# Patient Record
Sex: MALE | Race: WHITE | NOT HISPANIC OR LATINO | Employment: UNEMPLOYED | URBAN - METROPOLITAN AREA
[De-identification: names, ages, dates, MRNs, and addresses within clinical notes are randomized per-mention and may not be internally consistent; named-entity substitution may affect disease eponyms.]

---

## 2022-05-04 ENCOUNTER — APPOINTMENT (OUTPATIENT)
Dept: CT IMAGING | Facility: CLINIC | Age: 42
End: 2022-05-04
Attending: EMERGENCY MEDICINE
Payer: COMMERCIAL

## 2022-05-04 ENCOUNTER — HOSPITAL ENCOUNTER (OUTPATIENT)
Facility: CLINIC | Age: 42
Setting detail: OBSERVATION
Discharge: HOME | End: 2022-05-05
Attending: EMERGENCY MEDICINE | Admitting: INTERNAL MEDICINE
Payer: COMMERCIAL

## 2022-05-04 DIAGNOSIS — R00.2 PALPITATIONS: ICD-10-CM

## 2022-05-04 DIAGNOSIS — F10.930 ALCOHOL WITHDRAWAL SYNDROME WITHOUT COMPLICATION (H): Primary | ICD-10-CM

## 2022-05-04 DIAGNOSIS — R19.00 INTRAABDOMINAL MASS: ICD-10-CM

## 2022-05-04 DIAGNOSIS — R00.0 TACHYCARDIA: ICD-10-CM

## 2022-05-04 DIAGNOSIS — R06.02 SHORTNESS OF BREATH: ICD-10-CM

## 2022-05-04 PROBLEM — F10.939 ALCOHOL WITHDRAWAL (H): Status: ACTIVE | Noted: 2022-05-04

## 2022-05-04 LAB
ALBUMIN SERPL-MCNC: 4.1 G/DL (ref 3.4–5)
ALP SERPL-CCNC: 64 U/L (ref 40–150)
ALT SERPL W P-5'-P-CCNC: 52 U/L (ref 0–70)
ANION GAP SERPL CALCULATED.3IONS-SCNC: 4 MMOL/L (ref 3–14)
AST SERPL W P-5'-P-CCNC: 17 U/L (ref 0–45)
BASOPHILS # BLD AUTO: 0 10E3/UL (ref 0–0.2)
BASOPHILS NFR BLD AUTO: 0 %
BILIRUB SERPL-MCNC: 1.1 MG/DL (ref 0.2–1.3)
BUN SERPL-MCNC: 17 MG/DL (ref 7–30)
CALCIUM SERPL-MCNC: 9.1 MG/DL (ref 8.5–10.1)
CHLORIDE BLD-SCNC: 105 MMOL/L (ref 94–109)
CO2 SERPL-SCNC: 28 MMOL/L (ref 20–32)
CREAT SERPL-MCNC: 0.93 MG/DL (ref 0.66–1.25)
EOSINOPHIL # BLD AUTO: 0.1 10E3/UL (ref 0–0.7)
EOSINOPHIL NFR BLD AUTO: 2 %
ERYTHROCYTE [DISTWIDTH] IN BLOOD BY AUTOMATED COUNT: 13.2 % (ref 10–15)
GFR SERPL CREATININE-BSD FRML MDRD: >90 ML/MIN/1.73M2
GLUCOSE BLD-MCNC: 130 MG/DL (ref 70–99)
HCT VFR BLD AUTO: 44.6 % (ref 40–53)
HGB BLD-MCNC: 14.7 G/DL (ref 13.3–17.7)
IMM GRANULOCYTES # BLD: 0 10E3/UL
IMM GRANULOCYTES NFR BLD: 0 %
LIPASE SERPL-CCNC: 79 U/L (ref 73–393)
LYMPHOCYTES # BLD AUTO: 1.4 10E3/UL (ref 0.8–5.3)
LYMPHOCYTES NFR BLD AUTO: 18 %
MCH RBC QN AUTO: 29.3 PG (ref 26.5–33)
MCHC RBC AUTO-ENTMCNC: 33 G/DL (ref 31.5–36.5)
MCV RBC AUTO: 89 FL (ref 78–100)
MONOCYTES # BLD AUTO: 0.8 10E3/UL (ref 0–1.3)
MONOCYTES NFR BLD AUTO: 10 %
NEUTROPHILS # BLD AUTO: 5.5 10E3/UL (ref 1.6–8.3)
NEUTROPHILS NFR BLD AUTO: 70 %
NRBC # BLD AUTO: 0 10E3/UL
NRBC BLD AUTO-RTO: 0 /100
PLATELET # BLD AUTO: 303 10E3/UL (ref 150–450)
POTASSIUM BLD-SCNC: 3.5 MMOL/L (ref 3.4–5.3)
PROT SERPL-MCNC: 7.6 G/DL (ref 6.8–8.8)
RBC # BLD AUTO: 5.01 10E6/UL (ref 4.4–5.9)
SARS-COV-2 RNA RESP QL NAA+PROBE: NEGATIVE
SODIUM SERPL-SCNC: 137 MMOL/L (ref 133–144)
TROPONIN I SERPL HS-MCNC: 4 NG/L
TROPONIN I SERPL HS-MCNC: <3 NG/L
TSH SERPL DL<=0.005 MIU/L-ACNC: 0.83 MU/L (ref 0.4–4)
WBC # BLD AUTO: 7.9 10E3/UL (ref 4–11)

## 2022-05-04 PROCEDURE — G0378 HOSPITAL OBSERVATION PER HR: HCPCS

## 2022-05-04 PROCEDURE — 250N000013 HC RX MED GY IP 250 OP 250 PS 637: Performed by: EMERGENCY MEDICINE

## 2022-05-04 PROCEDURE — 84443 ASSAY THYROID STIM HORMONE: CPT | Performed by: EMERGENCY MEDICINE

## 2022-05-04 PROCEDURE — 74177 CT ABD & PELVIS W/CONTRAST: CPT

## 2022-05-04 PROCEDURE — 96361 HYDRATE IV INFUSION ADD-ON: CPT

## 2022-05-04 PROCEDURE — C9803 HOPD COVID-19 SPEC COLLECT: HCPCS

## 2022-05-04 PROCEDURE — 93005 ELECTROCARDIOGRAM TRACING: CPT

## 2022-05-04 PROCEDURE — 83690 ASSAY OF LIPASE: CPT | Performed by: EMERGENCY MEDICINE

## 2022-05-04 PROCEDURE — 250N000011 HC RX IP 250 OP 636: Performed by: EMERGENCY MEDICINE

## 2022-05-04 PROCEDURE — 96374 THER/PROPH/DIAG INJ IV PUSH: CPT

## 2022-05-04 PROCEDURE — 250N000009 HC RX 250: Performed by: EMERGENCY MEDICINE

## 2022-05-04 PROCEDURE — 99285 EMERGENCY DEPT VISIT HI MDM: CPT | Mod: 25

## 2022-05-04 PROCEDURE — 36415 COLL VENOUS BLD VENIPUNCTURE: CPT | Performed by: EMERGENCY MEDICINE

## 2022-05-04 PROCEDURE — 85025 COMPLETE CBC W/AUTO DIFF WBC: CPT | Performed by: EMERGENCY MEDICINE

## 2022-05-04 PROCEDURE — 80053 COMPREHEN METABOLIC PANEL: CPT | Performed by: EMERGENCY MEDICINE

## 2022-05-04 PROCEDURE — 258N000003 HC RX IP 258 OP 636: Performed by: EMERGENCY MEDICINE

## 2022-05-04 PROCEDURE — 84484 ASSAY OF TROPONIN QUANT: CPT | Performed by: EMERGENCY MEDICINE

## 2022-05-04 PROCEDURE — U0003 INFECTIOUS AGENT DETECTION BY NUCLEIC ACID (DNA OR RNA); SEVERE ACUTE RESPIRATORY SYNDROME CORONAVIRUS 2 (SARS-COV-2) (CORONAVIRUS DISEASE [COVID-19]), AMPLIFIED PROBE TECHNIQUE, MAKING USE OF HIGH THROUGHPUT TECHNOLOGIES AS DESCRIBED BY CMS-2020-01-R: HCPCS | Performed by: EMERGENCY MEDICINE

## 2022-05-04 RX ORDER — GABAPENTIN 300 MG/1
900 CAPSULE ORAL EVERY 8 HOURS
Status: DISCONTINUED | OUTPATIENT
Start: 2022-05-05 | End: 2022-05-05 | Stop reason: HOSPADM

## 2022-05-04 RX ORDER — LORAZEPAM 2 MG/ML
1 INJECTION INTRAMUSCULAR ONCE
Status: COMPLETED | OUTPATIENT
Start: 2022-05-04 | End: 2022-05-04

## 2022-05-04 RX ORDER — FLUMAZENIL 0.1 MG/ML
0.2 INJECTION, SOLUTION INTRAVENOUS
Status: DISCONTINUED | OUTPATIENT
Start: 2022-05-04 | End: 2022-05-05 | Stop reason: HOSPADM

## 2022-05-04 RX ORDER — GABAPENTIN 300 MG/1
1200 CAPSULE ORAL ONCE
Status: COMPLETED | OUTPATIENT
Start: 2022-05-04 | End: 2022-05-04

## 2022-05-04 RX ORDER — FOLIC ACID 1 MG/1
1 TABLET ORAL DAILY
Status: DISCONTINUED | OUTPATIENT
Start: 2022-05-04 | End: 2022-05-05 | Stop reason: HOSPADM

## 2022-05-04 RX ORDER — MULTIPLE VITAMINS W/ MINERALS TAB 9MG-400MCG
1 TAB ORAL DAILY
Status: DISCONTINUED | OUTPATIENT
Start: 2022-05-04 | End: 2022-05-05 | Stop reason: HOSPADM

## 2022-05-04 RX ORDER — GABAPENTIN 100 MG/1
100 CAPSULE ORAL EVERY 8 HOURS
Status: DISCONTINUED | OUTPATIENT
Start: 2022-05-12 | End: 2022-05-05 | Stop reason: HOSPADM

## 2022-05-04 RX ORDER — DIAZEPAM 5 MG
10 TABLET ORAL EVERY 30 MIN PRN
Status: DISCONTINUED | OUTPATIENT
Start: 2022-05-04 | End: 2022-05-05 | Stop reason: HOSPADM

## 2022-05-04 RX ORDER — HALOPERIDOL 5 MG/ML
2.5-5 INJECTION INTRAMUSCULAR EVERY 6 HOURS PRN
Status: DISCONTINUED | OUTPATIENT
Start: 2022-05-04 | End: 2022-05-05 | Stop reason: HOSPADM

## 2022-05-04 RX ORDER — OLANZAPINE 5 MG/1
5-10 TABLET, ORALLY DISINTEGRATING ORAL EVERY 6 HOURS PRN
Status: DISCONTINUED | OUTPATIENT
Start: 2022-05-04 | End: 2022-05-05 | Stop reason: HOSPADM

## 2022-05-04 RX ORDER — DIAZEPAM 10 MG/2ML
5-10 INJECTION, SOLUTION INTRAMUSCULAR; INTRAVENOUS EVERY 30 MIN PRN
Status: DISCONTINUED | OUTPATIENT
Start: 2022-05-04 | End: 2022-05-05 | Stop reason: HOSPADM

## 2022-05-04 RX ORDER — GABAPENTIN 300 MG/1
300 CAPSULE ORAL EVERY 8 HOURS
Status: DISCONTINUED | OUTPATIENT
Start: 2022-05-10 | End: 2022-05-05 | Stop reason: HOSPADM

## 2022-05-04 RX ORDER — IOPAMIDOL 755 MG/ML
111 INJECTION, SOLUTION INTRAVASCULAR ONCE
Status: COMPLETED | OUTPATIENT
Start: 2022-05-04 | End: 2022-05-04

## 2022-05-04 RX ORDER — GABAPENTIN 300 MG/1
600 CAPSULE ORAL EVERY 8 HOURS
Status: DISCONTINUED | OUTPATIENT
Start: 2022-05-08 | End: 2022-05-05 | Stop reason: HOSPADM

## 2022-05-04 RX ORDER — CLONIDINE HYDROCHLORIDE 0.1 MG/1
0.1 TABLET ORAL EVERY 8 HOURS
Status: DISCONTINUED | OUTPATIENT
Start: 2022-05-04 | End: 2022-05-05 | Stop reason: HOSPADM

## 2022-05-04 RX ORDER — IBUPROFEN 200 MG
400-800 TABLET ORAL EVERY 8 HOURS PRN
COMMUNITY

## 2022-05-04 RX ADMIN — MULTIPLE VITAMINS W/ MINERALS TAB 1 TABLET: TAB at 22:26

## 2022-05-04 RX ADMIN — SODIUM CHLORIDE 1000 ML: 9 INJECTION, SOLUTION INTRAVENOUS at 15:32

## 2022-05-04 RX ADMIN — SODIUM CHLORIDE 100 ML: 9 INJECTION, SOLUTION INTRAVENOUS at 16:58

## 2022-05-04 RX ADMIN — THIAMINE HCL TAB 100 MG 100 MG: 100 TAB at 22:26

## 2022-05-04 RX ADMIN — DIAZEPAM 10 MG: 5 TABLET ORAL at 22:38

## 2022-05-04 RX ADMIN — CLONIDINE HYDROCHLORIDE 0.1 MG: 0.1 TABLET ORAL at 22:25

## 2022-05-04 RX ADMIN — LORAZEPAM 1 MG: 2 INJECTION INTRAMUSCULAR; INTRAVENOUS at 19:50

## 2022-05-04 RX ADMIN — FOLIC ACID 1 MG: 1 TABLET ORAL at 22:26

## 2022-05-04 RX ADMIN — IOPAMIDOL 111 ML: 755 INJECTION, SOLUTION INTRAVENOUS at 16:57

## 2022-05-04 RX ADMIN — GABAPENTIN 1200 MG: 300 CAPSULE ORAL at 22:26

## 2022-05-04 ASSESSMENT — ENCOUNTER SYMPTOMS
LIGHT-HEADEDNESS: 1
FATIGUE: 1
DIAPHORESIS: 1
PALPITATIONS: 1

## 2022-05-04 NOTE — ED NOTES
Bed: ED29  Expected date: 5/4/22  Expected time: 3:02 PM  Means of arrival: Ambulance  Comments:  89- 74M - HTN, Tachy

## 2022-05-04 NOTE — ED TRIAGE NOTES
Pt presents after feeling dizzy and confused at the [facility].  Pt watch informed his of a rapid HR at this time.  EMS found pt to have a HR in the 150s, this had since resolved.  Pt currently denies SOB or CP.   Triage Assessment     Row Name 05/04/22 1514       Triage Assessment (Adult)    Airway WDL WDL       Respiratory WDL    Respiratory WDL WDL       Skin Circulation/Temperature WDL    Skin Circulation/Temperature WDL WDL       Cardiac WDL    Cardiac WDL X  Pt tachycardic with a HR in the 150s for EMS., HE in 120sa now       Peripheral/Neurovascular WDL    Peripheral Neurovascular WDL WDL       Cognitive/Neuro/Behavioral WDL    Cognitive/Neuro/Behavioral WDL WDL

## 2022-05-04 NOTE — ED PROVIDER NOTES
Here with near syncope, EKG with sinus tachycardia.  CT PE/abdomen pelvis: Awaiting CT and delta trop. Then discharge.     CT Chest (PE) Abdomen Pelvis w Contrast    (Results Pending)     Labs Ordered and Resulted from Time of ED Arrival to Time of ED Departure   COMPREHENSIVE METABOLIC PANEL - Abnormal       Result Value    Sodium 137      Potassium 3.5      Chloride 105      Carbon Dioxide (CO2) 28      Anion Gap 4      Urea Nitrogen 17      Creatinine 0.93      Calcium 9.1      Glucose 130 (*)     Alkaline Phosphatase 64      AST 17      ALT 52      Protein Total 7.6      Albumin 4.1      Bilirubin Total 1.1      GFR Estimate >90     TROPONIN I - Normal    Troponin I High Sensitivity <3     LIPASE - Normal    Lipase 79     TSH WITH FREE T4 REFLEX - Normal    TSH 0.83     TROPONIN I - Normal    Troponin I High Sensitivity 4     CBC WITH PLATELETS AND DIFFERENTIAL    WBC Count 7.9      RBC Count 5.01      Hemoglobin 14.7      Hematocrit 44.6      MCV 89      MCH 29.3      MCHC 33.0      RDW 13.2      Platelet Count 303      % Neutrophils 70      % Lymphocytes 18      % Monocytes 10      % Eosinophils 2      % Basophils 0      % Immature Granulocytes 0      NRBCs per 100 WBC 0      Absolute Neutrophils 5.5      Absolute Lymphocytes 1.4      Absolute Monocytes 0.8      Absolute Eosinophils 0.1      Absolute Basophils 0.0      Absolute Immature Granulocytes 0.0      Absolute NRBCs 0.0     COVID-19 VIRUS (CORONAVIRUS) BY PCR     Patient here for evaluation of elevated heart rate, confirmed to be sinus tachycardia on EKG.  He had a comprehensive negative work-up as above however on recheck had worsening tachycardia despite initial improvement IV fluids, his heart rate jumped up in the 140s and 150s with ambulation, he did feel little bit lightheaded, and initial check he did not have any overt tongue fasciculation however with Ativan his heart rate did improve some, on recheck heart rate continued to be elevated and he  manifested tongue fasciculations and then further discussion he reports heavier drinking over the last 1 month, no alcohol for the last 2 days.  I suspect likely moderate alcohol withdrawal as a cause of his persistent tachycardia, and hypertension.  He was placed on CIWA protocol, started on gabapentin loading and will admit to observation under Dr. 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    Attila Delaney MD  [employer]  5:37 PM 05/04/22        Attila Delaney MD  05/04/22 3718

## 2022-05-04 NOTE — ED PROVIDER NOTES
History   Chief Complaint:  Tachycardia       The history is provided by the patient.      Elliott Newton is a 42 year old male with an unknown medical history who presents with tachycardia. The patient recently [place] the [place] five [place] ago for a conference. Since then, he has been consistently fatigued due to a lack of sleep as well as trying to learn so much information. He has been spending his days at sessions for the conference and at night, many of them go out drinking for social events. Today while at one of the sessions, he became diaphoretic and felt that his heart was racing. He then stood up to try and go back to his hotel but was light headed and could not remember how to get back. He denies any loss of consciousness or chest pain. He denies any history of heart disease. He denies any other symptoms. He takes an injectable drug to help with an unknown autoimmune disease in [place]. It is like rheumatoid arthritis but not exactly and he cannot remember the name in English.     He tells me that he did not have any alcohol yesterday, and also that he drinks frequently back in [place], but not daily.  He has never gone through alcohol withdrawal before.     Review of Systems   Constitutional: Positive for diaphoresis and fatigue.   Cardiovascular: Positive for palpitations. Negative for chest pain.   Neurological: Positive for light-headedness.   All other systems reviewed and are negative.        Allergies:  The patient has no known allergies.     Medications:  The patient is currently on no regular medications.  Injection for autoimmune disease    Past Medical History:     The patient has no pertinent medical history.  Autoimmune disease treated in [place]      Past Surgical History:    The patient has no pertinent surgical history.     Family History:    The patient has no known family history.    Social History:  The patient presents to the ED alone. He is from [place].       Physical Exam  "    Patient Vitals for the past 24 hrs:   BP Temp Temp src Pulse Resp SpO2 Height Weight   05/04/22 1512 (!) 150/105 98.7  F (37.1  C) Temporal 120 18 98 % 1.778 m (5' 10\") 100 kg (220 lb 7.4 oz)       Physical Exam  Vitals: reviewed by me  General: Pt seen on [facility], cooperative, and alert to conversation.  Mildly diaphoretic.  Eyes: Tracking well, clear conjunctiva BL  ENT: MMM, midline trachea.   Lungs: No tachypnea, no accessory muscle use. No respiratory distress.  Good air movement bilaterally.  CV: Rate as above, tachycardic.  Normal S1-S2, no additional heart sounds heard  Abd: Soft, vague periumbilical tenderness to palpation, no guarding, no rebound.  MSK: no joint effusion.  No evidence of trauma  Skin: No rash  Neuro: Clear speech and no facial droop.  Moving all extremity spontaneously, no tremors.  Moving all extremity spontaneously, following all commands  Psych: Not RIS, no e/o AH/VH    Emergency Department Course   ECG  ECG obtained at 1519, ECG read at 1520  Sinus tachycardia. Moderate voltage criteria for LVH, may be normal variant. Borderline ECG.    Rate 132 bpm. MN interval 130 ms. QRS duration 84 ms. QT/QTc 314/465 ms. P-R-T axes 62 -15 44.     Imaging:  CT Chest (PE) Abdomen Pelvis w Contrast   Final Result   IMPRESSION:   1.  No evidence of pulmonary embolus or other acute abnormality in the chest.   2.  No acute abnormality in the abdomen or pelvis.   3.  Cholelithiasis.   4.  Incidental left upper quadrant multiloculated cystic-appearing lesion. Differential includes lymphangioma, less likely enteric duplication cyst. This is unlikely to contribute to patient's acute symptoms. Comparison with prior imaging would be    helpful to document stability. Otherwise, could consider MRI on a nonemergent basis.        Report per radiology    Laboratory:  Labs Ordered and Resulted from Time of ED Arrival to Time of ED Departure   COMPREHENSIVE METABOLIC PANEL - Abnormal       " Result Value    Sodium 137      Potassium 3.5      Chloride 105      Carbon Dioxide (CO2) 28      Anion Gap 4      Urea Nitrogen 17      Creatinine 0.93      Calcium 9.1      Glucose 130 (*)     Alkaline Phosphatase 64      AST 17      ALT 52      Protein Total 7.6      Albumin 4.1      Bilirubin Total 1.1      GFR Estimate >90     TROPONIN I - Normal    Troponin I High Sensitivity <3     LIPASE - Normal    Lipase 79     TSH WITH FREE T4 REFLEX - Normal    TSH 0.83     TROPONIN I - Normal    Troponin I High Sensitivity 4     COVID-19 VIRUS (CORONAVIRUS) BY PCR - Normal    SARS CoV2 PCR Negative     CBC WITH PLATELETS AND DIFFERENTIAL    WBC Count 7.9      RBC Count 5.01      Hemoglobin 14.7      Hematocrit 44.6      MCV 89      MCH 29.3      MCHC 33.0      RDW 13.2      Platelet Count 303      % Neutrophils 70      % Lymphocytes 18      % Monocytes 10      % Eosinophils 2      % Basophils 0      % Immature Granulocytes 0      NRBCs per 100 WBC 0      Absolute Neutrophils 5.5      Absolute Lymphocytes 1.4      Absolute Monocytes 0.8      Absolute Eosinophils 0.1      Absolute Basophils 0.0      Absolute Immature Granulocytes 0.0      Absolute NRBCs 0.0          Procedures    Emergency Department Course:    Reviewed:  I reviewed nursing notes and vitals    Assessments:  1520 I obtained history and examined the patient as noted above.   1743 I rechecked the patient and explained findings.  Currently pending CT scan read, patient asking if he will be out of here soon enough to [place] it [place] tonight.      Interventions:  Medications   iopamidol (ISOVUE-370) solution 111 mL (has no administration in time range)   Saline Flush (has no administration in time range)   0.9% sodium chloride BOLUS (1,000 mLs Intravenous New Bag 5/4/22 1532)       Disposition:  Care of the patient was transferred to my colleague Dr. Delaney pending CT scan, delta troponin, and resolution of tachycardia.     Impression & Plan      Medical Decision Making:  This is a very pleasant 42-year-old male presents emergency room with what appears to be sinus tachycardia, diaphoresis, and dehydration.  He did have some red bull earlier today, which may have caused some of these palpitations, and specifically I will note that I do not see any evidence of A. fib or SVT or cardiac arrhythmia on his EKG. At time of signout, he is pending a CT scan to rule out PE as a cause of his tachycardia, delta troponin, and resolution of his tachycardia.  At rest he is about 110, but when stimulated or when he gets up to walk, he goes back up into the 130s.  His TSH is normal and I see no evidence of thyroid storm.  I asked him about his alcohol history, and he states he does not drink daily and will go several days at a time without drinking back home without issue. Here, his last drink was 2 days ago, which does raise the possibility of alcohol withdrawal, though he has no tremors on my exam at this point.  He initially told me he wanted to leave and go to the [place], but he is still persistently tachycardic and I do not think he should leave unless his vital signs normalized of course.  Will sign out to Dr. Delaney, pending his CT scan for rule out of PE, delta troponin, and resolution of tachycardia.      Diagnosis:    ICD-10-CM    1. Alcohol withdrawal syndrome without complication (H)  F10.230    2. Tachycardia  R00.0    3. Palpitations  R00.2    4. Shortness of breath  R06.02    5. Intraabdominal mass  R19.00     likely cyst, needs repeat imaging (MRI) following up with your primary doctor       Discharge Medications:  New Prescriptions    No medications on file       Scribe Disclosure:  I, Tabatha Mendez, am serving as a scribe at 3:13 PM on 5/4/2022 to document services personally performed by Florentin Terrazas MD based on my observations and the provider's statements to me.              Florentin Terrazas MD  05/05/22  0718

## 2022-05-05 VITALS
HEART RATE: 101 BPM | SYSTOLIC BLOOD PRESSURE: 135 MMHG | WEIGHT: 220.46 LBS | DIASTOLIC BLOOD PRESSURE: 95 MMHG | HEIGHT: 70 IN | TEMPERATURE: 98.3 F | OXYGEN SATURATION: 96 % | BODY MASS INDEX: 31.56 KG/M2 | RESPIRATION RATE: 16 BRPM

## 2022-05-05 PROCEDURE — 250N000013 HC RX MED GY IP 250 OP 250 PS 637: Performed by: STUDENT IN AN ORGANIZED HEALTH CARE EDUCATION/TRAINING PROGRAM

## 2022-05-05 PROCEDURE — 250N000013 HC RX MED GY IP 250 OP 250 PS 637: Performed by: INTERNAL MEDICINE

## 2022-05-05 PROCEDURE — 250N000013 HC RX MED GY IP 250 OP 250 PS 637: Performed by: EMERGENCY MEDICINE

## 2022-05-05 PROCEDURE — 99217 PR OBSERVATION CARE DISCHARGE: CPT | Performed by: STUDENT IN AN ORGANIZED HEALTH CARE EDUCATION/TRAINING PROGRAM

## 2022-05-05 PROCEDURE — 258N000003 HC RX IP 258 OP 636: Performed by: INTERNAL MEDICINE

## 2022-05-05 PROCEDURE — G0378 HOSPITAL OBSERVATION PER HR: HCPCS

## 2022-05-05 PROCEDURE — 258N000003 HC RX IP 258 OP 636: Performed by: EMERGENCY MEDICINE

## 2022-05-05 PROCEDURE — 96361 HYDRATE IV INFUSION ADD-ON: CPT

## 2022-05-05 RX ORDER — LIDOCAINE 40 MG/G
CREAM TOPICAL
Status: DISCONTINUED | OUTPATIENT
Start: 2022-05-05 | End: 2022-05-05 | Stop reason: HOSPADM

## 2022-05-05 RX ORDER — ONDANSETRON 4 MG/1
4 TABLET, FILM COATED ORAL EVERY 8 HOURS PRN
Qty: 12 TABLET | Refills: 0 | Status: SHIPPED | OUTPATIENT
Start: 2022-05-05

## 2022-05-05 RX ORDER — ACETAMINOPHEN 650 MG/1
650 SUPPOSITORY RECTAL EVERY 6 HOURS PRN
Status: DISCONTINUED | OUTPATIENT
Start: 2022-05-05 | End: 2022-05-05 | Stop reason: HOSPADM

## 2022-05-05 RX ORDER — ONDANSETRON 2 MG/ML
4 INJECTION INTRAMUSCULAR; INTRAVENOUS EVERY 6 HOURS PRN
Status: DISCONTINUED | OUTPATIENT
Start: 2022-05-05 | End: 2022-05-05 | Stop reason: HOSPADM

## 2022-05-05 RX ORDER — PROCHLORPERAZINE 25 MG
25 SUPPOSITORY, RECTAL RECTAL EVERY 12 HOURS PRN
Status: DISCONTINUED | OUTPATIENT
Start: 2022-05-05 | End: 2022-05-05 | Stop reason: HOSPADM

## 2022-05-05 RX ORDER — AMLODIPINE BESYLATE 10 MG/1
10 TABLET ORAL DAILY
Status: DISCONTINUED | OUTPATIENT
Start: 2022-05-05 | End: 2022-05-05 | Stop reason: HOSPADM

## 2022-05-05 RX ORDER — AMOXICILLIN 250 MG
1 CAPSULE ORAL 2 TIMES DAILY PRN
Status: DISCONTINUED | OUTPATIENT
Start: 2022-05-05 | End: 2022-05-05 | Stop reason: HOSPADM

## 2022-05-05 RX ORDER — BISACODYL 10 MG
10 SUPPOSITORY, RECTAL RECTAL DAILY PRN
Status: DISCONTINUED | OUTPATIENT
Start: 2022-05-05 | End: 2022-05-05 | Stop reason: HOSPADM

## 2022-05-05 RX ORDER — PROCHLORPERAZINE MALEATE 10 MG
10 TABLET ORAL EVERY 6 HOURS PRN
Status: DISCONTINUED | OUTPATIENT
Start: 2022-05-05 | End: 2022-05-05 | Stop reason: HOSPADM

## 2022-05-05 RX ORDER — AMOXICILLIN 250 MG
2 CAPSULE ORAL 2 TIMES DAILY PRN
Status: DISCONTINUED | OUTPATIENT
Start: 2022-05-05 | End: 2022-05-05 | Stop reason: HOSPADM

## 2022-05-05 RX ORDER — ACETAMINOPHEN 325 MG/1
650 TABLET ORAL EVERY 6 HOURS PRN
Status: DISCONTINUED | OUTPATIENT
Start: 2022-05-05 | End: 2022-05-05 | Stop reason: HOSPADM

## 2022-05-05 RX ORDER — AMLODIPINE BESYLATE 10 MG/1
10 TABLET ORAL DAILY
Qty: 30 TABLET | Refills: 0 | Status: SHIPPED | OUTPATIENT
Start: 2022-05-05

## 2022-05-05 RX ORDER — IBUPROFEN 600 MG/1
600 TABLET, FILM COATED ORAL EVERY 6 HOURS PRN
Status: DISCONTINUED | OUTPATIENT
Start: 2022-05-05 | End: 2022-05-05 | Stop reason: HOSPADM

## 2022-05-05 RX ORDER — FOLIC ACID 1 MG/1
1 TABLET ORAL DAILY
Qty: 10 TABLET | Refills: 0 | Status: SHIPPED | OUTPATIENT
Start: 2022-05-06

## 2022-05-05 RX ORDER — ONDANSETRON 4 MG/1
4 TABLET, ORALLY DISINTEGRATING ORAL EVERY 6 HOURS PRN
Status: DISCONTINUED | OUTPATIENT
Start: 2022-05-05 | End: 2022-05-05 | Stop reason: HOSPADM

## 2022-05-05 RX ORDER — NITROGLYCERIN 0.4 MG/1
0.4 TABLET SUBLINGUAL EVERY 5 MIN PRN
Status: DISCONTINUED | OUTPATIENT
Start: 2022-05-05 | End: 2022-05-05 | Stop reason: HOSPADM

## 2022-05-05 RX ORDER — LANOLIN ALCOHOL/MO/W.PET/CERES
100 CREAM (GRAM) TOPICAL DAILY
Qty: 10 TABLET | Refills: 0 | Status: SHIPPED | OUTPATIENT
Start: 2022-05-06

## 2022-05-05 RX ORDER — SODIUM CHLORIDE 9 MG/ML
INJECTION, SOLUTION INTRAVENOUS CONTINUOUS
Status: DISCONTINUED | OUTPATIENT
Start: 2022-05-05 | End: 2022-05-05 | Stop reason: HOSPADM

## 2022-05-05 RX ADMIN — SODIUM CHLORIDE: 9 INJECTION, SOLUTION INTRAVENOUS at 11:44

## 2022-05-05 RX ADMIN — GABAPENTIN 900 MG: 300 CAPSULE ORAL at 05:35

## 2022-05-05 RX ADMIN — CLONIDINE HYDROCHLORIDE 0.1 MG: 0.1 TABLET ORAL at 05:35

## 2022-05-05 RX ADMIN — GABAPENTIN 900 MG: 300 CAPSULE ORAL at 15:15

## 2022-05-05 RX ADMIN — MELATONIN 5 MG TABLET 5 MG: at 00:28

## 2022-05-05 RX ADMIN — CLONIDINE HYDROCHLORIDE 0.1 MG: 0.1 TABLET ORAL at 13:20

## 2022-05-05 RX ADMIN — MULTIPLE VITAMINS W/ MINERALS TAB 1 TABLET: TAB at 09:08

## 2022-05-05 RX ADMIN — THIAMINE HCL TAB 100 MG 100 MG: 100 TAB at 09:08

## 2022-05-05 RX ADMIN — DIAZEPAM 10 MG: 5 TABLET ORAL at 00:31

## 2022-05-05 RX ADMIN — SODIUM CHLORIDE 1000 ML: 9 INJECTION, SOLUTION INTRAVENOUS at 00:24

## 2022-05-05 RX ADMIN — FOLIC ACID 1 MG: 1 TABLET ORAL at 09:08

## 2022-05-05 RX ADMIN — AMLODIPINE BESYLATE 10 MG: 10 TABLET ORAL at 13:20

## 2022-05-05 RX ADMIN — SODIUM CHLORIDE: 9 INJECTION, SOLUTION INTRAVENOUS at 02:19

## 2022-05-05 NOTE — PROGRESS NOTES
A&Ox4. VSS on RA ex slightly tachy (low 100s this afternoon) and htn improving, last /95. Tele: sinus tach. Denies nausea, SOB, chest pain/discomfort. No hallucinations or tactile disturbances. CIWA 3 for paroxysmal sweats. Ambulating at baseline, steady on feet. Skye reg diet, good appetite. Reviewed AVS and new medication education. Encouraged cessation of alcohol consumption. 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 Denies further questions/concerns. Pt discharging back to his hotel via uber.

## 2022-05-05 NOTE — H&P
Admitted: 05/04/2022    PRIMARY CARE PHYSICIAN:  In [place].    CODE STATUS:  Full code.    CHIEF COMPLAINT:  Tachycardia and diaphoresis.    HISTORY OF PRESENT ILLNESS:  Mr. Newton is a 42-year-old male with a past medical history significant for ankylosing spondylitis and SUSAN, who presents to the Emergency Department with the above concerns.  History is obtained through discussion with the ED physician as well as the patient.  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  He has been here for approximately 5 days and notes that he still feels a bit jet lagged and so has been using Red Bull in the mornings at times to try to help equilibrate and has spent the entire days thus far at IT conference trying to learn as much as possible and then attending social events and drinking a bit more than typical in the evenings.  The patient states that he has generally just felt fatigued, but this morning woke up feeling a bit shaky in his arms and more fatigued than typical.  He was sitting at the conference today when his watch alerted him that he had a heart rate of over 120 beats per minute for greater than 10 minutes.  Shortly thereafter, he started getting a bit dizzy and feeling diaphoretic as well as mildly short of breath with a dry throat.  He denied any palpitations or feeling like his heart was beating fast and denied any chest pain or chest pressure.  He got up to go back to the hotel, which he thinks was about 700 meters of walk through the mall and actually got lost while trying to find the signs, could not quite make his way around.  He eventually presented to the Emergency Department for evaluation because of these symptoms.    The patient is from [place] and is typically fairly healthy with the exception of ankylosing spondylitis, for which he had previously taken Enbrel, but recently switched to a different injection medication that he cannot recall the  name.  He also has SUSAN, for which he uses CPAP.  He states he has actually been drinking more heavily over the past couple of months.  It sounds [place] typically having 10 shots of alcohol plus 5 beers on most nights, though his last drink was 2 days ago, on Monday.  Prior to that, back in [place], he would drink every weekend and occasionally during the week, it sounds like 10-15 shots at a time.  He does not recall specifically going through alcohol withdrawal, but does note that after drinking heavily in the past, he has sometimes woken up feeling a bit shaky in the mornings.  He feels like he occasionally will have these fast heartbeats, but otherwise does not typically have hypertension, does not get headaches and does not feel diaphoretic.  He has had some more stooling than typical, up to 3 times per day, which fluctuates between normally formed and looser.  No blood in his stool or urine.    In the ED, the patient had a troponin that was 3 and delta was at 4.  CT of the chest with contrast was negative for PE, did show gallstones in the left upper quadrant, a cystic lesion that is not associated with the adrenal glands.  Initial plan was for him to discharge to home after getting IV fluids; however, when he was talking or got up to walk, his heart rate would jump up into the 130s-140s and baseline was read about 100-110, which is atypical for him.  He also started becoming a little more diaphoretic, had tongue fasciculations, and tremors consistent with possible alcohol withdrawal.  He received a dose of Valium and actually had improvement in his symptoms.  He is still tachycardic, but feeling better at this point.    PAST MEDICAL HISTORY:  1.  Ankylosing spondylitis, recently on Enbrel, now on a new medication.  2. SUSAN, with use of CPAP.    MEDICATIONS:  He is currently taking p.r.n. ibuprofen and recently switched from Enbrel to another injectable.    SOCIAL HISTORY:  The  patient smokes 4-5 cigarettes per day and recently has been drinking more than typical, up to 15 shots or drinks per day.    FAMILY HISTORY:  Father is alive and mother passed away from bone cancer.    ALLERGIES:  NO KNOWN DRUG ALLERGIES.    REVIEW OF SYSTEMS:  A complete review of systems reviewed and negative, save for the pertinent positives recorded in HPI.    PHYSICAL EXAMINATION:  VITAL SIGNS:  Show blood pressure of 155/109, heart rate in the one-teens to 130s, respirations 14, satting 95% on room air, temperature 98.7 degrees Fahrenheit.  GENERAL:  The patient is lying in bed and appears mildly diaphoretic.  HEENT:  Pupils equal, round, reactive to light.  Extraocular muscle function intact.  No scleral icterus.  Oropharynx is clear.  NECK:  No lymphadenopathy or thyromegaly.  CARDIOVASCULAR:  Heart is tachycardic, but regular.  No murmur, rub, or gallop.  PULMONARY:  Lungs are clear to auscultation bilaterally, without wheeze or rhonchi.  GASTROINTESTINAL:  Positive bowel sounds. Soft, nontender, and nondistended.  SKIN:  No rashes or lesions.  LYMPHATICS:  No peripheral edema.  PSYCHIATRIC:  Alert and oriented x3.  Normal affect.  NEUROLOGIC:  Cranial nerves II through XII are grossly intact.  No focal deficits.    LABORATORY DATA:  CBC, BMP, LFTs unremarkable.  TSH normal.  Troponin went from 3-4.    CT of the abdomen with contrast is negative for PE or other acute abnormality.  There is cholelithiasis noted.  Incidentally found left upper quadrant multiloculated cystic-appearing lesion that is favored to be retroperitoneal, but that does abut the greater curvature of the stomach and is separate from the left adrenal gland, pancreatic tail, and spleen.    EKG shows sinus tachycardia.    ASSESSMENT AND PLAN:  Mr. 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  1.  Suspected alcohol withdrawal:  The patient has been drinking heavily for weeks to months with last drink about 2 days ago and certainly has symptoms consistent with alcohol withdrawal.  He has been initiated on protocol with Valium, gabapentin, multivitamin, thiamine, and folate, which will be continued.  I did discuss this with him, that it would be recommended that he stay in the hospital overnight and potentially longer until his alcohol withdrawal symptoms have improved substantially, which he seems to understand, but is also hoping to discharge as soon as possible, including potentially tomorrow morning to go back to the conference.  He does appear to be somewhat dehydrated clinically, so we will continue with IV fluids.  While I think the tachycardia is most likely being driven by his alcohol withdrawal, would like to monitor him on telemetry to ensure there are no other arrhythmias that could be playing a role here.  He is currently hypertensive, which I expect to improve with appropriate treatment of alcohol withdrawal as I do not believe he is hypertensive normally.  2.  SUSAN:  We will place order for CPAP.  2.  Left upper quadrant multiloculated cystic appearing lesion:  I did consider something like a pheochromocytoma, but he does not have typical symptoms of episodic headache, flushing, and no resistant hypertension, and the lesion appears to be completely separate from the adrenal glands, which would make it less likely.  I did discuss this with him, and he was not aware of this prior, but agrees that additional workup would be prudent, which recommendation is with an MRI.  We discussed that this is not emergent, and he could follow up back in [place].  3.  Ankylosing spondylitis:  He takes an injectable at home, previously Enbrel.  I am not currently suspecting this is playing a role in his symptoms and he can resume per typical regimen.  4.  Disposition:   Brought under observation status as the patient is hoping to discharge tomorrow, but will depend on his use of benzos and alcohol withdrawal symptoms.  If he is not able to discharge tomorrow, would advance to inpatient status.    Rui Gramajo DO        D: 2022   T: 2022   MT: acd    Name:     CHRIS BONILLA  MRN:      [id number]        Account:     [id number]   :      1980           Admitted:    2022       Document: [id number]

## 2022-05-05 NOTE — DISCHARGE SUMMARY
[facility]  Hospitalist Discharge Summary      Date of Admission:  5/4/2022  Date of Discharge:  5/5/2022  Discharging Provider: Adriano Bustos MD  Discharge Service: Hospitalist Service    Discharge Diagnoses     Acute Alcohol Withdrawl    Follow-ups Needed After Discharge   Follow-up Appointments     Follow-up and recommended labs and tests       Follow up with primary care provider, Physician No Ref-Primary, within 7   days for hospital follow- up.  No follow up labs or test are needed.           Unresulted Labs Ordered in the Past 30 Days of this Admission     No orders found for last 31 day(s).        Discharge Disposition     Discharged to home  Condition at discharge: Stable    Hospital Course       Mr. 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    1.  Acute alcohol withdrawal:  The patient has been drinking heavily for weeks to months with last drink about 2 days ago and certainly has symptoms consistent with alcohol withdrawal.  He has been initiated on protocol with Valium, gabapentin, multivitamin, thiamine, and folate, which will be continued.  I did discuss this with him, that it would be recommended that he stay in the hospital overnight and potentially longer until his alcohol withdrawal symptoms have improved substantially, which he seems to understand, but is also hoping to discharge as soon as possible, including potentially tomorrow morning to go back to the conference.  improved with supportive cares  Plan:  - Continue Folate / Thiamine  - Norvasc started for HTN  - Zofran as needed for nausea      2.  SUSAN:  Continue PTA CPAP    2.  Left upper quadrant multiloculated cystic appearing lesion:  I did consider something like a pheochromocytoma, but he does not have typical symptoms of episodic  headache, flushing, and no resistant hypertension, and the lesion appears to be completely separate from the adrenal glands, which would make it less likely.  I did discuss this with him, and he was not aware of this prior, but agrees that additional workup would be prudent, which recommendation is with an MRI.  We discussed that this is not emergent, and he could follow up back in [place].    3.  Ankylosing spondylitis:  He takes an injectable at home, previously Enbrel.  I am not currently suspecting this is playing a role in his symptoms and he can resume per typical regimen.    Consultations This Hospital Stay   None    Code Status   Full Code    Time Spent on this Encounter   I, Adriano Bustos MD, personally saw the patient today and spent greater than 30 minutes discharging this patient.       Adriano Bustos MD  [facility] EXTENDED RECOVERY AND SHORT STAY  [address] [place] [id number]  Phone: [phone]  ______________________________________________________________________    Physical Exam   Vital Signs: Temp: 98.3  F (36.8  C) Temp src: Oral BP: (!) 135/95 Pulse: 101   Resp: 16 SpO2: 96 % O2 Device: None (Room air)    Weight: 220 lbs 7.36 oz       Primary Care Physician   Physician No Ref-Primary    Discharge Orders      Reason for your hospital stay    You had alcohol withdrawl     Follow-up and recommended labs and tests     Follow up with primary care provider, Physician No Ref-Primary, within 7 days for hospital follow- up.  No follow up labs or test are needed.     Activity    Your activity upon discharge: activity as tolerated     Diet    Follow this diet upon discharge: Orders Placed This Encounter      Regular Diet Adult       Significant Results and Procedures   Most Recent 3 CBC's:Recent Labs   Lab Test 05/04/22  1529   WBC 7.9   HGB 14.7   MCV 89        Most Recent 3 BMP's:Recent Labs   Lab Test 05/04/22  1529      POTASSIUM 3.5   CHLORIDE 105   CO2 28    BUN 17   CR 0.93   ANIONGAP 4   SHIRLEY 9.1   *     Most Recent 2 LFT's:Recent Labs   Lab Test 05/04/22  1529   AST 17   ALT 52   ALKPHOS 64   BILITOTAL 1.1     Most Recent 3 INR's:No lab results found.,   Results for orders placed or performed during the hospital encounter of 05/04/22   CT Chest (PE) Abdomen Pelvis w Contrast    Narrative    EXAM: CT CHEST PE ABDOMEN PELVIS W CONTRAST  LOCATION: [facility]  DATE/TIME: 5/4/2022 4:44 PM    INDICATION: Tachycardia, palpitations.  COMPARISON: None.  TECHNIQUE: CT chest pulmonary angiogram and routine CT abdomen pelvis with IV contrast. Arterial phase through the chest and venous phase through the abdomen and pelvis. Multiplanar reformats and MIP reconstructions were performed. Dose reduction   techniques were used.   CONTRAST: 111mL Isovue 370    FINDINGS:  ANGIOGRAM CHEST: Pulmonary arteries are normal caliber and negative for pulmonary emboli. Thoracic aorta is negative for dissection. No CT evidence of right heart strain.     LUNGS AND PLEURA: Minimal right middle lobe atelectasis. No suspicious nodules or focal airspace consolidation. No pleural effusion.    MEDIASTINUM/AXILLAE: Normal.    CORONARY ARTERY CALCIFICATION: None.    HEPATOBILIARY: Cholelithiasis without evidence of acute cholecystitis. No biliary obstruction.    PANCREAS: Normal.    SPLEEN: Normal.    ADRENAL GLANDS: Normal.    KIDNEYS/BLADDER: No hydronephrosis. Small bilateral likely cysts, no specific follow-up recommended. Urinary bladder is unremarkable.    BOWEL: No obstruction or inflammatory change. Normal appendix.    LYMPH NODES: No suspicious lymphadenopathy. In the left upper quadrant there is a homogeneous, multiloculated cystic-appearing lesion measuring up to 9.8 x 6.7 cm (series 12 image 64). This is favored to be retroperitoneal in origin, although it does   abut the greater curvature of the stomach.  It is separate from the left adrenal gland, pancreatic  tail and spleen.    VASCULATURE: Minimal calcified atherosclerosis. No aortic dissection.    PELVIC ORGANS: Normal.    MUSCULOSKELETAL: No acute bony abnormality. Partial fusion of the sacroiliac joints.      Impression    IMPRESSION:  1.  No evidence of pulmonary embolus or other acute abnormality in the chest.  2.  No acute abnormality in the abdomen or pelvis.  3.  Cholelithiasis.  4.  Incidental left upper quadrant multiloculated cystic-appearing lesion. Differential includes lymphangioma, less likely enteric duplication cyst. This is unlikely to contribute to patient's acute symptoms. Comparison with prior imaging would be   helpful to document stability. Otherwise, could consider MRI on a nonemergent basis.       Discharge Medications   Current Discharge Medication List      START taking these medications    Details   amLODIPine (NORVASC) 10 MG tablet Take 1 tablet (10 mg) by mouth daily  Qty: 30 tablet, Refills: 0    Associated Diagnoses: Alcohol withdrawal syndrome without complication (H)      folic acid (FOLVITE) 1 MG tablet Take 1 tablet (1 mg) by mouth daily  Qty: 10 tablet, Refills: 0    Associated Diagnoses: Alcohol withdrawal syndrome without complication (H)      ondansetron (ZOFRAN) 4 MG tablet Take 1 tablet (4 mg) by mouth every 8 hours as needed for nausea  Qty: 12 tablet, Refills: 0    Associated Diagnoses: Alcohol withdrawal syndrome without complication (H)      thiamine (B-1) 100 MG tablet Take 1 tablet (100 mg) by mouth daily  Qty: 10 tablet, Refills: 0    Associated Diagnoses: Alcohol withdrawal syndrome without complication (H)         CONTINUE these medications which have NOT CHANGED    Details   ibuprofen (ADVIL/MOTRIN) 200 MG tablet Take 400-800 mg by mouth every 8 hours as needed for mild pain      UNKNOWN TO PATIENT Injectable medication for Rheumatoid Arthritis           Allergies   No Known Allergies

## 2022-05-05 NOTE — PROGRESS NOTES
MD Notification    Notified Person: MD    Notified Person Name: Dr. Bustos    Notification Date/Time: 1132 5/5/22    Notification Interaction: [email or web address] messaging    Purpose of Notification: FYI BP still elevated. 0730 /111. 1119 /110. HR 90s-110s this morning.    Orders Received: No new orders, continue to monitor.     Comments: Next dose of clonidine scheduled at 1315. Pt scoring 3 on CIWA. Eating. Up SBA, steady on feet. Denies pain/discomfort, denies any respiratory symptoms.

## 2022-05-05 NOTE — PHARMACY-ADMISSION MEDICATION HISTORY
Pharmacy Medication History  Admission medication history interview status for the 5/4/2022  admission is complete. See EPIC admission navigator for prior to admission medications     Location of Interview: Patient room  Medication history sources: Patient, Surescripts and Care Everywhere    Significant changes made to the medication list:  Added all meds    Additional medication history information:   Patient stated that he uses an injectable medication, that he does not know the name of, for his rheumatoid arthritis.      Time spent in this activity: 20 minutes    Prior to Admission medications    Medication Sig Last Dose Taking? Auth Provider   ibuprofen (ADVIL/MOTRIN) 200 MG tablet Take 400-800 mg by mouth every 8 hours as needed for mild pain 5/3/2022 at HS Yes Unknown, Entered By History   UNKNOWN TO PATIENT Injectable medication for Rheumatoid Arthritis Unknown at Unknown time Yes Unknown, Entered By History       The information provided in this note is only as accurate as the sources available at the time of update(s)

## 2022-05-05 NOTE — PROGRESS NOTES
OBS GOALS    Etoh withdrawal complete: IN PROGRESS; CIWA 4 for paroxysmal sweats    tachycardia resolved: NOT MET; Tele showing HR low 100s    able to ambulate independently: MET

## 2022-05-05 NOTE — PROGRESS NOTES
RECEIVING UNIT ED HANDOFF REVIEW    ED Nurse Handoff Report was reviewed by: Olga Lidia Worrell RN on May 5, 2022 at 1:09 AM

## 2022-05-06 LAB
ATRIAL RATE - MUSE: 132 BPM
DIASTOLIC BLOOD PRESSURE - MUSE: NORMAL MMHG
INTERPRETATION ECG - MUSE: NORMAL
P AXIS - MUSE: 62 DEGREES
PR INTERVAL - MUSE: 130 MS
QRS DURATION - MUSE: 84 MS
QT - MUSE: 314 MS
QTC - MUSE: 465 MS
R AXIS - MUSE: -15 DEGREES
SYSTOLIC BLOOD PRESSURE - MUSE: NORMAL MMHG
T AXIS - MUSE: 44 DEGREES
VENTRICULAR RATE- MUSE: 132 BPM